# Patient Record
Sex: FEMALE | Race: BLACK OR AFRICAN AMERICAN | NOT HISPANIC OR LATINO | Employment: UNEMPLOYED | URBAN - METROPOLITAN AREA
[De-identification: names, ages, dates, MRNs, and addresses within clinical notes are randomized per-mention and may not be internally consistent; named-entity substitution may affect disease eponyms.]

---

## 2022-12-21 ENCOUNTER — PROCEDURE VISIT (OUTPATIENT)
Dept: MATERNAL FETAL MEDICINE | Facility: CLINIC | Age: 22
End: 2022-12-21
Payer: MEDICAID

## 2022-12-21 ENCOUNTER — OFFICE VISIT (OUTPATIENT)
Dept: OBSTETRICS AND GYNECOLOGY | Facility: CLINIC | Age: 22
End: 2022-12-21
Payer: MEDICAID

## 2022-12-21 VITALS
SYSTOLIC BLOOD PRESSURE: 114 MMHG | HEIGHT: 64 IN | DIASTOLIC BLOOD PRESSURE: 70 MMHG | BODY MASS INDEX: 24.59 KG/M2 | WEIGHT: 144 LBS | HEART RATE: 70 BPM

## 2022-12-21 DIAGNOSIS — Z32.01 POSITIVE URINE PREGNANCY TEST: ICD-10-CM

## 2022-12-21 DIAGNOSIS — N92.6 MISSED MENSES: Primary | ICD-10-CM

## 2022-12-21 PROCEDURE — 3074F PR MOST RECENT SYSTOLIC BLOOD PRESSURE < 130 MM HG: ICD-10-PCS | Mod: CPTII,S$GLB,,

## 2022-12-21 PROCEDURE — 99203 PR OFFICE/OUTPT VISIT, NEW, LEVL III, 30-44 MIN: ICD-10-PCS | Mod: TH,S$GLB,,

## 2022-12-21 PROCEDURE — 1159F MED LIST DOCD IN RCRD: CPT | Mod: CPTII,S$GLB,,

## 2022-12-21 PROCEDURE — 99203 OFFICE O/P NEW LOW 30 MIN: CPT | Mod: TH,S$GLB,,

## 2022-12-21 PROCEDURE — 1160F PR REVIEW ALL MEDS BY PRESCRIBER/CLIN PHARMACIST DOCUMENTED: ICD-10-PCS | Mod: CPTII,S$GLB,,

## 2022-12-21 PROCEDURE — 76815 US OB/GYN PROCEDURE (VIEWPOINT): ICD-10-PCS | Mod: S$GLB,,, | Performed by: OBSTETRICS & GYNECOLOGY

## 2022-12-21 PROCEDURE — 3078F PR MOST RECENT DIASTOLIC BLOOD PRESSURE < 80 MM HG: ICD-10-PCS | Mod: CPTII,S$GLB,,

## 2022-12-21 PROCEDURE — 1160F RVW MEDS BY RX/DR IN RCRD: CPT | Mod: CPTII,S$GLB,,

## 2022-12-21 PROCEDURE — 3008F PR BODY MASS INDEX (BMI) DOCUMENTED: ICD-10-PCS | Mod: CPTII,S$GLB,,

## 2022-12-21 PROCEDURE — 1159F PR MEDICATION LIST DOCUMENTED IN MEDICAL RECORD: ICD-10-PCS | Mod: CPTII,S$GLB,,

## 2022-12-21 PROCEDURE — 3008F BODY MASS INDEX DOCD: CPT | Mod: CPTII,S$GLB,,

## 2022-12-21 PROCEDURE — 3078F DIAST BP <80 MM HG: CPT | Mod: CPTII,S$GLB,,

## 2022-12-21 PROCEDURE — 3074F SYST BP LT 130 MM HG: CPT | Mod: CPTII,S$GLB,,

## 2022-12-21 PROCEDURE — 76815 OB US LIMITED FETUS(S): CPT | Mod: S$GLB,,, | Performed by: OBSTETRICS & GYNECOLOGY

## 2022-12-21 NOTE — PROGRESS NOTES
SUBJECTIVE:       Chief Complaint: Confirmation (Patient is here for a pregnancy confirmation. )    History of Present Illness: Trevor Cortes is a 22 y.o. female,  who presents for a confirmation of pregnancy. Patient reports absent menses and a positive home pregnancy test. She states her menses were previously regular. She is not currently on any contraception. She has no other complaints, issues, or concerns at this time. Patient denies nausea, vomiting, fatigue, cramping, and vaginal bleeding. She denies alcohol use, cigarette smoking, vaping, marijuana use, and illegal substance use. She denies a history of genital herpes. Patient reports she is taking a prenatal vitamin.    OB History    Para Term  AB Living   1 0 0 0 0 0   SAB IAB Ectopic Multiple Live Births   0 0 0 0 0      # Outcome Date GA Lbr Adrian/2nd Weight Sex Delivery Anes PTL Lv   1 Current               Review of Systems   Constitutional:  Negative for activity change, appetite change, chills, fatigue, fever and unexpected weight change.   HENT:  Negative for nasal congestion, dental problem, ear pain, postnasal drip, rhinorrhea, sinus pressure/congestion, sneezing and sore throat.    Eyes:  Negative for discharge, visual disturbance and eye dryness.   Respiratory:  Negative for cough, chest tightness and shortness of breath.    Cardiovascular:  Negative for chest pain, palpitations and leg swelling.   Gastrointestinal:  Negative for abdominal distention, abdominal pain, change in bowel habit, constipation, diarrhea, nausea, vomiting, reflux and change in bowel habit.   Endocrine: Negative for cold intolerance, heat intolerance, polydipsia and polyuria.   Genitourinary:  Negative for difficulty urinating, dyspareunia, dysuria, frequency, genital sores, hot flashes, menstrual irregularity, menstrual problem, pelvic pain, urgency, vaginal bleeding, vaginal discharge, vaginal pain and vaginal dryness.   Musculoskeletal:  Negative  "for back pain, myalgias, neck pain and neck stiffness.   Integumentary:  Negative for rash, breast mass, breast discharge and breast tenderness.   Allergic/Immunologic: Negative for environmental allergies and immunocompromised state.   Neurological:  Negative for dizziness, syncope, weakness, light-headedness, numbness and headaches.   Hematological:  Negative for adenopathy. Does not bruise/bleed easily.   Psychiatric/Behavioral:  Negative for confusion, decreased concentration and sleep disturbance. The patient is not nervous/anxious.    Breast: Negative for mass and tenderness      OBJECTIVE:      /70   Pulse 70   Ht 5' 4" (1.626 m)   Wt 65.3 kg (144 lb)   LMP 09/02/2022   BMI 24.72 kg/m²     Physical Exam:   Constitutional: She is oriented to person, place, and time. Vital signs are normal. She appears well-developed and well-nourished. She is cooperative.    HENT:   Head: Normocephalic and atraumatic.      Cardiovascular:  Normal rate and regular rhythm.             Pulmonary/Chest: Effort normal.                  Musculoskeletal: Moves all extremeties.      Right lower leg: No edema.      Left lower leg: No edema.       Neurological: She is alert and oriented to person, place, and time. GCS eye subscore is 4. GCS verbal subscore is 5. GCS motor subscore is 6.    Skin: Skin is warm and dry. Capillary refill takes less than 2 seconds.    Psychiatric: She has a normal mood and affect. Her speech is normal and behavior is normal. Mood, affect and thought content normal.     FHT = 150 bpm      ASSESSMENT:       1. Missed menses    2. Positive urine pregnancy test      KRISTI by LMP: 6/9/2023  EGA: 15 weeks 5 days      PLAN:      Missed menses  -     POCT Urine Pregnancy    Positive urine pregnancy test  -     US OB/GYN Procedure ([facility])-Future; Future; Expected date: 12/22/2022    Prenatal Counseling:  Prenatal vitamins with folic acid/folate  Common complaints of pregnancy in the first trimester " (nausea, vomiting, fatigue)  Foods to avoid (sushi, fish that are high in mercury, deli meat, and unpasteurized cheeses)  Routine prenatal testing including optional  genetic screening  Risk factors identified by prenatal history  Oriented to practice - discussed anticipated course of prenatal care and indications for ultrasound  Childbirth classes  Hospital facilities for delivery and emergency care  Nutrition and proper weight gain based on the [place] [employer]'s recommendations based on her pre-pregnancy weight  Toxoplasmosis precautions (cats)  Sexual activity and exercise  Environmental/work hazards  Travel  Tobacco (Ask, Advise, Assess, Assist, and Arrange), as well as alcohol and drug use  Use of any medications (including supplements, vitamins, herbs, or OTC Drugs)  Domestic violence  Seat belt use    Patient does desire genetic testing. She does want to know the sex of the baby. Ultrasound to confirm viability and KRISTI to be scheduled before next visit.    Follow up in about 1 week (around 2022) for initial OB appointment.

## 2022-12-23 DIAGNOSIS — Z36.89 ENCOUNTER FOR FETAL ANATOMIC SURVEY: Primary | ICD-10-CM

## 2022-12-29 ENCOUNTER — ROUTINE PRENATAL (OUTPATIENT)
Dept: OBSTETRICS AND GYNECOLOGY | Facility: CLINIC | Age: 22
End: 2022-12-29
Payer: MEDICAID

## 2022-12-29 ENCOUNTER — LAB VISIT (OUTPATIENT)
Dept: LAB | Facility: CLINIC | Age: 22
End: 2022-12-29
Payer: MEDICAID

## 2022-12-29 VITALS
BODY MASS INDEX: 24.63 KG/M2 | WEIGHT: 147.81 LBS | DIASTOLIC BLOOD PRESSURE: 54 MMHG | HEART RATE: 74 BPM | HEIGHT: 65 IN | SYSTOLIC BLOOD PRESSURE: 94 MMHG

## 2022-12-29 DIAGNOSIS — O09.32 LATE PRENATAL CARE AFFECTING PREGNANCY IN SECOND TRIMESTER: ICD-10-CM

## 2022-12-29 DIAGNOSIS — Z34.02 NORMAL FIRST PREGNANCY CONFIRMED, CURRENTLY IN SECOND TRIMESTER: ICD-10-CM

## 2022-12-29 DIAGNOSIS — Z3A.17 17 WEEKS GESTATION OF PREGNANCY: Primary | ICD-10-CM

## 2022-12-29 LAB
ABO + RH BLD: NORMAL
ALBUMIN SERPL BCP-MCNC: 3.4 G/DL (ref 3.5–5.2)
ALP SERPL-CCNC: 52 U/L (ref 55–135)
ALT SERPL W/O P-5'-P-CCNC: 19 U/L (ref 10–44)
AMPHET+METHAMPHET UR QL: NEGATIVE
ANION GAP SERPL CALC-SCNC: 8 MMOL/L (ref 8–16)
AST SERPL-CCNC: 17 U/L (ref 10–40)
BARBITURATES UR QL SCN>200 NG/ML: NEGATIVE
BASOPHILS # BLD AUTO: 0.02 K/UL (ref 0–0.2)
BASOPHILS NFR BLD: 0.3 % (ref 0–1.9)
BENZODIAZ UR QL SCN>200 NG/ML: NEGATIVE
BILIRUB SERPL-MCNC: 0.3 MG/DL (ref 0.1–1)
BLD GP AB SCN CELLS X3 SERPL QL: NORMAL
BUN SERPL-MCNC: 6 MG/DL (ref 6–20)
BZE UR QL SCN: NEGATIVE
CALCIUM SERPL-MCNC: 9.6 MG/DL (ref 8.7–10.5)
CANNABINOIDS UR QL SCN: NEGATIVE
CHLORIDE SERPL-SCNC: 108 MMOL/L (ref 95–110)
CO2 SERPL-SCNC: 19 MMOL/L (ref 23–29)
CREAT SERPL-MCNC: 0.6 MG/DL (ref 0.5–1.4)
CREAT UR-MCNC: 119 MG/DL (ref 15–325)
DIFFERENTIAL METHOD: ABNORMAL
EOSINOPHIL # BLD AUTO: 0.1 K/UL (ref 0–0.5)
EOSINOPHIL NFR BLD: 1.6 % (ref 0–8)
ERYTHROCYTE [DISTWIDTH] IN BLOOD BY AUTOMATED COUNT: 13.2 % (ref 11.5–14.5)
EST. GFR  (NO RACE VARIABLE): >60 ML/MIN/1.73 M^2
ETHANOL UR-MCNC: <10 MG/DL
GLUCOSE SERPL-MCNC: 78 MG/DL (ref 70–110)
HCT VFR BLD AUTO: 29.3 % (ref 37–48.5)
HGB BLD-MCNC: 10.2 G/DL (ref 12–16)
IMM GRANULOCYTES # BLD AUTO: 0.06 K/UL (ref 0–0.04)
IMM GRANULOCYTES NFR BLD AUTO: 0.9 % (ref 0–0.5)
LYMPHOCYTES # BLD AUTO: 1.7 K/UL (ref 1–4.8)
LYMPHOCYTES NFR BLD: 26.2 % (ref 18–48)
MCH RBC QN AUTO: 30.3 PG (ref 27–31)
MCHC RBC AUTO-ENTMCNC: 34.8 G/DL (ref 32–36)
MCV RBC AUTO: 87 FL (ref 82–98)
METHADONE UR QL SCN>300 NG/ML: NEGATIVE
MONOCYTES # BLD AUTO: 0.4 K/UL (ref 0.3–1)
MONOCYTES NFR BLD: 6.6 % (ref 4–15)
NEUTROPHILS # BLD AUTO: 4.1 K/UL (ref 1.8–7.7)
NEUTROPHILS NFR BLD: 64.4 % (ref 38–73)
NRBC BLD-RTO: 0 /100 WBC
OPIATES UR QL SCN: NEGATIVE
PCP UR QL SCN>25 NG/ML: NEGATIVE
PLATELET # BLD AUTO: 233 K/UL (ref 150–450)
PMV BLD AUTO: 11.2 FL (ref 9.2–12.9)
POTASSIUM SERPL-SCNC: 3.3 MMOL/L (ref 3.5–5.1)
PROT SERPL-MCNC: 6.9 G/DL (ref 6–8.4)
RBC # BLD AUTO: 3.37 M/UL (ref 4–5.4)
SODIUM SERPL-SCNC: 135 MMOL/L (ref 136–145)
TOXICOLOGY INFORMATION: NORMAL
WBC # BLD AUTO: 6.41 K/UL (ref 3.9–12.7)

## 2022-12-29 PROCEDURE — 86762 RUBELLA ANTIBODY: CPT

## 2022-12-29 PROCEDURE — 86900 BLOOD TYPING SEROLOGIC ABO: CPT

## 2022-12-29 PROCEDURE — 36415 COLL VENOUS BLD VENIPUNCTURE: CPT | Mod: ,,, | Performed by: STUDENT IN AN ORGANIZED HEALTH CARE EDUCATION/TRAINING PROGRAM

## 2022-12-29 PROCEDURE — 99213 OFFICE O/P EST LOW 20 MIN: CPT | Mod: TH,S$GLB,,

## 2022-12-29 PROCEDURE — 87086 URINE CULTURE/COLONY COUNT: CPT

## 2022-12-29 PROCEDURE — 86592 SYPHILIS TEST NON-TREP QUAL: CPT

## 2022-12-29 PROCEDURE — 87389 HIV-1 AG W/HIV-1&-2 AB AG IA: CPT

## 2022-12-29 PROCEDURE — 82105 ALPHA-FETOPROTEIN SERUM: CPT

## 2022-12-29 PROCEDURE — 87591 N.GONORRHOEAE DNA AMP PROB: CPT

## 2022-12-29 PROCEDURE — 99213 PR OFFICE/OUTPT VISIT, EST, LEVL III, 20-29 MIN: ICD-10-PCS | Mod: TH,S$GLB,,

## 2022-12-29 PROCEDURE — 80307 DRUG TEST PRSMV CHEM ANLYZR: CPT

## 2022-12-29 PROCEDURE — 36415 PR COLLECTION VENOUS BLOOD,VENIPUNCTURE: ICD-10-PCS | Mod: ,,, | Performed by: STUDENT IN AN ORGANIZED HEALTH CARE EDUCATION/TRAINING PROGRAM

## 2022-12-29 PROCEDURE — 80053 COMPREHEN METABOLIC PANEL: CPT

## 2022-12-29 PROCEDURE — 80074 ACUTE HEPATITIS PANEL: CPT

## 2022-12-29 PROCEDURE — 85025 COMPLETE CBC W/AUTO DIFF WBC: CPT

## 2022-12-29 PROCEDURE — 87491 CHLMYD TRACH DNA AMP PROBE: CPT

## 2022-12-29 NOTE — PROGRESS NOTES
"SUBJECTIVE:      Trevor Cortes is a 22 y.o. female  at 17w2d presenting for a routine pregnancy visit.  Patient reports no fetal movements. She denies cramping, contractions, vaginal bleeding and leaking.  She is taking prenatal vitamins.    Estimated Date of Delivery: 2023, by Ultrasound, dating reviewed.  Prenatal labs reviewed.    Review of Systems   Constitutional: Negative for activity change, appetite change, chills, fatigue, fever and unexpected weight change.   HENT: Negative for nasal congestion, dental problem, ear pain, postnasal drip, rhinorrhea, sinus pressure/congestion, sneezing and sore throat.    Eyes: Negative for discharge, visual disturbance and eye dryness.   Respiratory: Negative for cough, chest tightness and shortness of breath.    Cardiovascular: Negative for chest pain, palpitations and leg swelling.   Gastrointestinal: Negative for abdominal distention, abdominal pain, change in bowel habit, constipation, diarrhea, nausea, vomiting, reflux and change in bowel habit.  Genitourinary: Negative for difficulty urinating, dyspareunia, dysuria, flank pain, frequency, urgency, cramps, contractions, vaginal bleeding, vaginal discharge and vaginal pain.  Musculoskeletal: Negative for back pain and myalgias.   Integumentary:  Negative for rash, breast mass, and breast discharge.  Neurological: Negative for dizziness, syncope, weakness, light-headedness, numbness, headaches, disturbances or difficulties in coordination.  Hematological: Negative for unexplained bruising or bleeding.  Psychiatric/Behavioral: Negative sleep disturbance. The patient is not nervous/anxious.    OBJECTIVE:      BP (!) 94/54   Pulse 74   Ht 5' 5" (1.651 m)   Wt 67 kg (147 lb 12.8 oz)   LMP 2022   BMI 24.60 kg/m²     Physical Exam  Constitutional: She is oriented to person, place, and time. She appears well-developed and well-nourished.    Head: Normocephalic and atraumatic.      Cardiovascular: Normal " rate and regular rhythm.  Pulmonary/Chest: Effort normal.      Abdominal: Soft. Gravid. There is no abdominal tenderness.    Genitourinary: Deferred  Musculoskeletal: Moves all extremeties.       Neurological: She is alert and oriented to person, place, and time. GCS eye subscore is 4. GCS verbal subscore is 5. GCS motor subscore is 6.    Skin: Skin is warm and dry. Capillary refill takes less than 2 seconds.    Psychiatric: Her speech is normal and behavior is normal. Mood, affect and thought content normal.    ASSESSMENT:       1. 17 weeks gestation of pregnancy    2. Normal first pregnancy confirmed, currently in second trimester    3. Late prenatal care affecting pregnancy in second trimester      PLAN:   17 weeks gestation of pregnancy    Normal first pregnancy confirmed, currently in second trimester  -     CBC Auto Differential; Future; Expected date: 12/29/2022  -     Comprehensive Metabolic Panel; Future; Expected date: 12/29/2022  -     HIV 1/2 Ag/Ab (4th Gen); Future; Expected date: 12/29/2022  -     RPR; Future; Expected date: 12/29/2022  -     Hepatitis Panel, Acute; Future; Expected date: 12/29/2022  -     C. trachomatis/N. gonorrhoeae by AMP DNA [facility]; Urine  -     Toxicology Screen, Urine  -     Urine culture  -     Rubella Antibody, IgG; Future; Expected date: 12/29/2022  -     Type & Screen - Ob Profile; Future; Expected date: 12/29/2022  -     Genetic Misc Sendout Test, Blood; Future; Expected date: 12/29/2022  -     Maternal Screen AFP (Single Marker); Future; Expected date: 12/29/2022    Late prenatal care affecting pregnancy in second trimester    Initial OB labs and genetic screening today. Anatomy scan in 3 weeks. Continue prenatal vitamins and other medications as prescribed. Fetal movement counts, bleeding, pain, and labor precautions reviewed.    Follow-up in 3 week(s) for routine OB appointment.

## 2022-12-30 DIAGNOSIS — O99.012 ANEMIA DURING PREGNANCY IN SECOND TRIMESTER: Primary | ICD-10-CM

## 2022-12-30 LAB
C TRACH DNA SPEC QL NAA+PROBE: DETECTED
HAV IGM SERPL QL IA: NORMAL
HBV CORE IGM SERPL QL IA: NORMAL
HBV SURFACE AG SERPL QL IA: NORMAL
HCV AB SERPL QL IA: NORMAL
HIV 1+2 AB+HIV1 P24 AG SERPL QL IA: NORMAL
N GONORRHOEA DNA SPEC QL NAA+PROBE: NOT DETECTED
RPR SER QL: NORMAL
RUBV IGG SER-ACNC: 33.5 IU/ML
RUBV IGG SER-IMP: REACTIVE

## 2022-12-30 RX ORDER — FERROUS SULFATE 325(65) MG
325 TABLET, DELAYED RELEASE (ENTERIC COATED) ORAL DAILY
Qty: 90 TABLET | Refills: 1 | Status: SHIPPED | OUTPATIENT
Start: 2022-12-30 | End: 2023-06-28

## 2022-12-31 ENCOUNTER — PATIENT MESSAGE (OUTPATIENT)
Dept: OBSTETRICS AND GYNECOLOGY | Facility: CLINIC | Age: 22
End: 2022-12-31
Payer: MEDICAID

## 2022-12-31 LAB — BACTERIA UR CULT: NO GROWTH

## 2023-01-03 DIAGNOSIS — O98.312 CHLAMYDIA TRACHOMATIS INFECTION IN MOTHER DURING SECOND TRIMESTER OF PREGNANCY: Primary | ICD-10-CM

## 2023-01-03 DIAGNOSIS — A56.8 CHLAMYDIA TRACHOMATIS INFECTION IN MOTHER DURING SECOND TRIMESTER OF PREGNANCY: Primary | ICD-10-CM

## 2023-01-03 LAB
# FETUSES US: NORMAL
AFP INTERPRETATION: NORMAL
AFP MOM SERPL: 0.76
AFP SERPL-MCNC: 36 NG/ML
AFP SERPL-MCNC: NEGATIVE NG/ML
AGE AT DELIVERY: 20
GA (DAYS): 2 D
GA (WEEKS): 17 WK
GESTATIONAL AGE METHOD: NORMAL
IDDM PATIENT QL: NORMAL
SMOKING STATUS FTND: NO

## 2023-01-03 RX ORDER — AZITHROMYCIN 500 MG/1
1000 TABLET, FILM COATED ORAL ONCE
Qty: 2 TABLET | Refills: 0 | Status: SHIPPED | OUTPATIENT
Start: 2023-01-03 | End: 2023-01-03

## 2023-01-10 ENCOUNTER — PROCEDURE VISIT (OUTPATIENT)
Dept: MATERNAL FETAL MEDICINE | Facility: CLINIC | Age: 23
End: 2023-01-10
Payer: MEDICAID

## 2023-01-10 DIAGNOSIS — Z36.89 ENCOUNTER FOR FETAL ANATOMIC SURVEY: ICD-10-CM

## 2023-01-10 PROCEDURE — 76805 OB US >/= 14 WKS SNGL FETUS: CPT | Mod: S$GLB,,, | Performed by: OBSTETRICS & GYNECOLOGY

## 2023-01-10 PROCEDURE — 76805 US OB/GYN PROCEDURE (VIEWPOINT): ICD-10-PCS | Mod: S$GLB,,, | Performed by: OBSTETRICS & GYNECOLOGY

## 2023-01-17 ENCOUNTER — PATIENT MESSAGE (OUTPATIENT)
Dept: OTHER | Facility: OTHER | Age: 23
End: 2023-01-17
Payer: MEDICAID

## 2023-02-06 ENCOUNTER — ROUTINE PRENATAL (OUTPATIENT)
Dept: OBSTETRICS AND GYNECOLOGY | Facility: CLINIC | Age: 23
End: 2023-02-06
Payer: MEDICAID

## 2023-02-06 VITALS — WEIGHT: 157 LBS | SYSTOLIC BLOOD PRESSURE: 110 MMHG | DIASTOLIC BLOOD PRESSURE: 64 MMHG | BODY MASS INDEX: 26.13 KG/M2

## 2023-02-06 DIAGNOSIS — Z3A.22 22 WEEKS GESTATION OF PREGNANCY: Primary | ICD-10-CM

## 2023-02-06 DIAGNOSIS — O98.312 CHLAMYDIA TRACHOMATIS INFECTION IN MOTHER DURING SECOND TRIMESTER OF PREGNANCY: ICD-10-CM

## 2023-02-06 DIAGNOSIS — A56.8 CHLAMYDIA TRACHOMATIS INFECTION IN MOTHER DURING SECOND TRIMESTER OF PREGNANCY: ICD-10-CM

## 2023-02-06 PROCEDURE — 99213 OFFICE O/P EST LOW 20 MIN: CPT | Mod: TH,S$GLB,,

## 2023-02-06 PROCEDURE — 99213 PR OFFICE/OUTPT VISIT, EST, LEVL III, 20-29 MIN: ICD-10-PCS | Mod: TH,S$GLB,,

## 2023-02-06 NOTE — PROGRESS NOTES
SUBJECTIVE:      Trevor Cortes is a 22 y.o. female  at 22w6d presenting for a routine pregnancy visit.  Patient reports good fetal movements. She denies cramping, contractions, vaginal bleeding and leaking.  She is taking prenatal vitamins.    Estimated Date of Delivery: 2023, by Ultrasound, dating reviewed.  Prenatal labs reviewed.    Review of Systems   Constitutional: Negative for activity change, appetite change, chills, fatigue, fever and unexpected weight change.   HENT: Negative for nasal congestion, dental problem, ear pain, postnasal drip, rhinorrhea, sinus pressure/congestion, sneezing and sore throat.    Eyes: Negative for discharge, visual disturbance and eye dryness.   Respiratory: Negative for cough, chest tightness and shortness of breath.    Cardiovascular: Negative for chest pain, palpitations and leg swelling.   Gastrointestinal: Negative for abdominal distention, abdominal pain, change in bowel habit, constipation, diarrhea, nausea, vomiting, reflux and change in bowel habit.  Genitourinary: Negative for difficulty urinating, dyspareunia, dysuria, flank pain, frequency, urgency, cramps, contractions, vaginal bleeding, vaginal discharge and vaginal pain.  Musculoskeletal: Negative for back pain and myalgias.   Integumentary:  Negative for rash, breast mass, and breast discharge.  Neurological: Negative for dizziness, syncope, weakness, light-headedness, numbness, headaches, disturbances or difficulties in coordination.  Hematological: Negative for unexplained bruising or bleeding.  Psychiatric/Behavioral: Negative sleep disturbance. The patient is not nervous/anxious.    OBJECTIVE:      /64   Wt 71.2 kg (157 lb)   LMP 2022   BMI 26.13 kg/m²     Physical Exam  Constitutional: She is oriented to person, place, and time. She appears well-developed and well-nourished.    Head: Normocephalic and atraumatic.      Cardiovascular: Normal rate and regular rhythm.  Pulmonary/Chest:  Effort normal.      Abdominal: Soft. Gravid. There is no abdominal tenderness.    Genitourinary: Deferred  Musculoskeletal: Moves all extremeties.       Neurological: She is alert and oriented to person, place, and time. GCS eye subscore is 4. GCS verbal subscore is 5. GCS motor subscore is 6.    Skin: Skin is warm and dry. Capillary refill takes less than 2 seconds.    Psychiatric: Her speech is normal and behavior is normal. Mood, affect and thought content normal.    ASSESSMENT:       1. 22 weeks gestation of pregnancy    2. Chlamydia trachomatis infection in mother during second trimester of pregnancy      PLAN:   22 weeks gestation of pregnancy    Chlamydia trachomatis infection in mother during second trimester of pregnancy  -     C. trachomatis/N. gonorrhoeae by AMP DNA [facility]; Urine    GARRY for CT today. Continue prenatal vitamins and other medications as prescribed. Fetal movement counts, bleeding, pain, and labor precautions reviewed.    Follow-up in 4 week(s) for routine OB appointment.

## 2023-02-14 ENCOUNTER — PATIENT MESSAGE (OUTPATIENT)
Dept: OTHER | Facility: OTHER | Age: 23
End: 2023-02-14
Payer: MEDICAID

## 2023-02-28 ENCOUNTER — PATIENT MESSAGE (OUTPATIENT)
Dept: OTHER | Facility: OTHER | Age: 23
End: 2023-02-28
Payer: MEDICAID

## 2023-03-14 ENCOUNTER — PATIENT MESSAGE (OUTPATIENT)
Dept: OBSTETRICS AND GYNECOLOGY | Facility: CLINIC | Age: 23
End: 2023-03-14
Payer: MEDICAID

## 2023-03-14 ENCOUNTER — PATIENT MESSAGE (OUTPATIENT)
Dept: OTHER | Facility: OTHER | Age: 23
End: 2023-03-14
Payer: MEDICAID

## 2023-03-15 ENCOUNTER — LAB VISIT (OUTPATIENT)
Dept: LAB | Facility: CLINIC | Age: 23
End: 2023-03-15
Payer: MEDICAID

## 2023-03-15 ENCOUNTER — ROUTINE PRENATAL (OUTPATIENT)
Dept: OBSTETRICS AND GYNECOLOGY | Facility: CLINIC | Age: 23
End: 2023-03-15
Payer: MEDICAID

## 2023-03-15 VITALS
WEIGHT: 168 LBS | DIASTOLIC BLOOD PRESSURE: 68 MMHG | SYSTOLIC BLOOD PRESSURE: 109 MMHG | BODY MASS INDEX: 27.96 KG/M2 | HEART RATE: 87 BPM

## 2023-03-15 DIAGNOSIS — Z86.19 HISTORY OF CHLAMYDIA: ICD-10-CM

## 2023-03-15 DIAGNOSIS — Z3A.29 29 WEEKS GESTATION OF PREGNANCY: Primary | ICD-10-CM

## 2023-03-15 DIAGNOSIS — Z3A.29 29 WEEKS GESTATION OF PREGNANCY: ICD-10-CM

## 2023-03-15 PROCEDURE — 99213 PR OFFICE/OUTPT VISIT, EST, LEVL III, 20-29 MIN: ICD-10-PCS | Mod: TH,S$GLB,,

## 2023-03-15 PROCEDURE — 82950 GLUCOSE TEST: CPT

## 2023-03-15 PROCEDURE — 87591 N.GONORRHOEAE DNA AMP PROB: CPT

## 2023-03-15 PROCEDURE — 99213 OFFICE O/P EST LOW 20 MIN: CPT | Mod: TH,S$GLB,,

## 2023-03-15 PROCEDURE — 85025 COMPLETE CBC W/AUTO DIFF WBC: CPT

## 2023-03-15 NOTE — PROGRESS NOTES
SUBJECTIVE:      Trevor Cortes is a 22 y.o. female  at 28w1d presenting for a routine pregnancy visit.  Patient reports good fetal movements. She denies cramping, contractions, vaginal bleeding and leaking.  She is taking prenatal vitamins.    Estimated Date of Delivery: 2023, by Ultrasound, dating reviewed.  Prenatal labs reviewed.    Review of Systems   Constitutional: Negative for activity change, appetite change, chills, fatigue, fever and unexpected weight change.   HENT: Negative for nasal congestion, dental problem, ear pain, postnasal drip, rhinorrhea, sinus pressure/congestion, sneezing and sore throat.    Eyes: Negative for discharge, visual disturbance and eye dryness.   Respiratory: Negative for cough, chest tightness and shortness of breath.    Cardiovascular: Negative for chest pain, palpitations and leg swelling.   Gastrointestinal: Negative for abdominal distention, abdominal pain, change in bowel habit, constipation, diarrhea, nausea, vomiting, reflux and change in bowel habit.  Genitourinary: Negative for difficulty urinating, dyspareunia, dysuria, flank pain, frequency, urgency, cramps, contractions, vaginal bleeding, vaginal discharge and vaginal pain.  Musculoskeletal: Negative for back pain and myalgias.   Integumentary:  Negative for rash, breast mass, and breast discharge.  Neurological: Negative for dizziness, syncope, weakness, light-headedness, numbness, headaches, disturbances or difficulties in coordination.  Hematological: Negative for unexplained bruising or bleeding.  Psychiatric/Behavioral: Negative sleep disturbance. The patient is not nervous/anxious.    OBJECTIVE:      /68   Pulse 87   Wt 76.2 kg (168 lb)   LMP 2022   BMI 27.96 kg/m²     Physical Exam  Constitutional: She is oriented to person, place, and time. She appears well-developed and well-nourished.    Head: Normocephalic and atraumatic.      Cardiovascular: Normal rate and regular  rhythm.  Pulmonary/Chest: Effort normal.      Abdominal: Soft. Gravid. There is no abdominal tenderness.    Genitourinary: Deferred  Musculoskeletal: Moves all extremeties.       Neurological: She is alert and oriented to person, place, and time. GCS eye subscore is 4. GCS verbal subscore is 5. GCS motor subscore is 6.    Skin: Skin is warm and dry. Capillary refill takes less than 2 seconds.    Psychiatric: Her speech is normal and behavior is normal. Mood, affect and thought content normal.    ASSESSMENT:       1. 29 weeks gestation of pregnancy    2. History of chlamydia      PLAN:   29 weeks gestation of pregnancy  -     OB Glucose Screen; Future; Expected date: 03/15/2023  -     CBC Auto Differential; Future; Expected date: 03/15/2023    History of chlamydia  -     C. trachomatis/N. gonorrhoeae by AMP DNA [facility]; Urine    CT GARRY today as well as OB glucose challenge. Compliance with prenatal care reinforced. Continue prenatal vitamins and other medications as prescribed. Fetal movement counts, bleeding, pain, and labor precautions reviewed.    Follow-up in 2 week(s) for routine OB appointment.

## 2023-03-16 LAB
BASOPHILS # BLD AUTO: 0.03 K/UL (ref 0–0.2)
BASOPHILS NFR BLD: 0.3 % (ref 0–1.9)
DIFFERENTIAL METHOD: ABNORMAL
EOSINOPHIL # BLD AUTO: 0.1 K/UL (ref 0–0.5)
EOSINOPHIL NFR BLD: 1.5 % (ref 0–8)
ERYTHROCYTE [DISTWIDTH] IN BLOOD BY AUTOMATED COUNT: 12.5 % (ref 11.5–14.5)
GLUCOSE SERPL-MCNC: 70 MG/DL (ref 70–140)
HCT VFR BLD AUTO: 27 % (ref 37–48.5)
HGB BLD-MCNC: 9.2 G/DL (ref 12–16)
IMM GRANULOCYTES # BLD AUTO: 0.09 K/UL (ref 0–0.04)
IMM GRANULOCYTES NFR BLD AUTO: 1 % (ref 0–0.5)
LYMPHOCYTES # BLD AUTO: 1.3 K/UL (ref 1–4.8)
LYMPHOCYTES NFR BLD: 14.2 % (ref 18–48)
MCH RBC QN AUTO: 30.5 PG (ref 27–31)
MCHC RBC AUTO-ENTMCNC: 34.1 G/DL (ref 32–36)
MCV RBC AUTO: 89 FL (ref 82–98)
MONOCYTES # BLD AUTO: 0.7 K/UL (ref 0.3–1)
MONOCYTES NFR BLD: 7.3 % (ref 4–15)
NEUTROPHILS # BLD AUTO: 7 K/UL (ref 1.8–7.7)
NEUTROPHILS NFR BLD: 75.7 % (ref 38–73)
NRBC BLD-RTO: 0 /100 WBC
PLATELET # BLD AUTO: 187 K/UL (ref 150–450)
PMV BLD AUTO: 12.2 FL (ref 9.2–12.9)
RBC # BLD AUTO: 3.02 M/UL (ref 4–5.4)
WBC # BLD AUTO: 9.29 K/UL (ref 3.9–12.7)

## 2023-03-16 PROCEDURE — 36415 COLL VENOUS BLD VENIPUNCTURE: CPT | Mod: ,,, | Performed by: STUDENT IN AN ORGANIZED HEALTH CARE EDUCATION/TRAINING PROGRAM

## 2023-03-16 PROCEDURE — 36415 PR COLLECTION VENOUS BLOOD,VENIPUNCTURE: ICD-10-PCS | Mod: ,,, | Performed by: STUDENT IN AN ORGANIZED HEALTH CARE EDUCATION/TRAINING PROGRAM

## 2023-03-17 LAB
C TRACH DNA SPEC QL NAA+PROBE: NOT DETECTED
N GONORRHOEA DNA SPEC QL NAA+PROBE: NOT DETECTED

## 2023-03-28 ENCOUNTER — PATIENT MESSAGE (OUTPATIENT)
Dept: OTHER | Facility: OTHER | Age: 23
End: 2023-03-28
Payer: MEDICAID

## 2023-03-31 ENCOUNTER — ROUTINE PRENATAL (OUTPATIENT)
Dept: OBSTETRICS AND GYNECOLOGY | Facility: CLINIC | Age: 23
End: 2023-03-31
Payer: MEDICAID

## 2023-03-31 VITALS
DIASTOLIC BLOOD PRESSURE: 63 MMHG | BODY MASS INDEX: 28.02 KG/M2 | WEIGHT: 168.38 LBS | HEART RATE: 86 BPM | SYSTOLIC BLOOD PRESSURE: 114 MMHG

## 2023-03-31 DIAGNOSIS — Z3A.30 30 WEEKS GESTATION OF PREGNANCY: Primary | ICD-10-CM

## 2023-03-31 DIAGNOSIS — Z23 NEED FOR TDAP VACCINATION: ICD-10-CM

## 2023-03-31 DIAGNOSIS — O99.013 ANEMIA DURING PREGNANCY IN THIRD TRIMESTER: ICD-10-CM

## 2023-03-31 PROCEDURE — 90715 PR TDAP VACCINE >7 YO, IM: ICD-10-PCS | Mod: S$GLB,,,

## 2023-03-31 PROCEDURE — 90715 TDAP VACCINE 7 YRS/> IM: CPT | Mod: S$GLB,,,

## 2023-03-31 PROCEDURE — 90471 IMMUNIZATION ADMIN: CPT | Mod: S$GLB,,,

## 2023-03-31 PROCEDURE — 59425 ANTEPARTUM CARE ONLY: CPT | Mod: TH,S$GLB,,

## 2023-03-31 PROCEDURE — 59425 PR ANTEPARTUM CARE ONLY, 4-6 VISITS: ICD-10-PCS | Mod: TH,S$GLB,,

## 2023-03-31 PROCEDURE — 90471 PR IMMUNIZ ADMIN,1 SINGLE/COMB VAC/TOXOID: ICD-10-PCS | Mod: S$GLB,,,

## 2023-03-31 NOTE — NURSING
.Patient presents for Tdap vaccination.  Tdap vaccine given IM per provider order.  See MAR for further details.  Patient tolerated well.  No signs or symptoms of adverse reaction after 15 minutes.

## 2023-03-31 NOTE — PROGRESS NOTES
SUBJECTIVE:      Trevor Cortes is a 22 y.o. female  at 30w3d presenting for a routine pregnancy visit.  Patient reports good fetal movements. She denies cramping, contractions, vaginal bleeding and leaking.  She is taking prenatal vitamins.    Estimated Date of Delivery: 2023, by Ultrasound, dating reviewed.  Prenatal labs reviewed.    Review of Systems   Constitutional: Negative for activity change, appetite change, chills, fatigue, fever and unexpected weight change.   HENT: Negative for nasal congestion, dental problem, ear pain, postnasal drip, rhinorrhea, sinus pressure/congestion, sneezing and sore throat.    Eyes: Negative for discharge, visual disturbance and eye dryness.   Respiratory: Negative for cough, chest tightness and shortness of breath.    Cardiovascular: Negative for chest pain, palpitations and leg swelling.   Gastrointestinal: Negative for abdominal distention, abdominal pain, change in bowel habit, constipation, diarrhea, nausea, vomiting, reflux and change in bowel habit.  Genitourinary: Negative for difficulty urinating, dyspareunia, dysuria, flank pain, frequency, urgency, cramps, contractions, vaginal bleeding, vaginal discharge and vaginal pain.  Musculoskeletal: Negative for back pain and myalgias.   Integumentary:  Negative for rash, breast mass, and breast discharge.  Neurological: Negative for dizziness, syncope, weakness, light-headedness, numbness, headaches, disturbances or difficulties in coordination.  Hematological: Negative for unexplained bruising or bleeding.  Psychiatric/Behavioral: Negative sleep disturbance. The patient is not nervous/anxious.    OBJECTIVE:      /63   Pulse 86   Wt 76.4 kg (168 lb 6.4 oz)   LMP 2022   BMI 28.02 kg/m²     Physical Exam  Constitutional: She is oriented to person, place, and time. She appears well-developed and well-nourished.    Head: Normocephalic and atraumatic.      Cardiovascular: Normal rate and regular  rhythm.  Pulmonary/Chest: Effort normal.      Abdominal: Soft. Gravid. There is no abdominal tenderness.    Genitourinary: Deferred  Musculoskeletal: Moves all extremeties.       Neurological: She is alert and oriented to person, place, and time. GCS eye subscore is 4. GCS verbal subscore is 5. GCS motor subscore is 6.    Skin: Skin is warm and dry. Capillary refill takes less than 2 seconds.    Psychiatric: Her speech is normal and behavior is normal. Mood, affect and thought content normal.    ASSESSMENT:       1. 30 weeks gestation of pregnancy    2. Anemia during pregnancy in third trimester    3. Need for Tdap vaccination      PLAN:   30 weeks gestation of pregnancy    Anemia during pregnancy in third trimester    Need for Tdap vaccination  -     Tdap (BOOSTRIX) vaccine injection 0.5 mL    Tdap today. Recheck CBC and RPR at 34-36 weeks. Continue prenatal vitamins, iron, and other medications as prescribed. Fetal movement counts, bleeding, pain, and labor precautions reviewed.    Follow-up in 2 week(s) for routine OB appointment.

## 2023-04-10 ENCOUNTER — PATIENT MESSAGE (OUTPATIENT)
Dept: OBSTETRICS AND GYNECOLOGY | Facility: CLINIC | Age: 23
End: 2023-04-10
Payer: MEDICAID

## 2023-04-11 ENCOUNTER — PATIENT MESSAGE (OUTPATIENT)
Dept: OTHER | Facility: OTHER | Age: 23
End: 2023-04-11
Payer: MEDICAID

## 2023-04-14 ENCOUNTER — ROUTINE PRENATAL (OUTPATIENT)
Dept: OBSTETRICS AND GYNECOLOGY | Facility: CLINIC | Age: 23
End: 2023-04-14
Payer: MEDICAID

## 2023-04-14 VITALS — WEIGHT: 174.88 LBS | SYSTOLIC BLOOD PRESSURE: 108 MMHG | BODY MASS INDEX: 29.1 KG/M2 | DIASTOLIC BLOOD PRESSURE: 63 MMHG

## 2023-04-14 DIAGNOSIS — O26.843 FUNDAL HEIGHT LOW FOR DATES IN THIRD TRIMESTER: ICD-10-CM

## 2023-04-14 DIAGNOSIS — O99.013 ANEMIA DURING PREGNANCY IN THIRD TRIMESTER: ICD-10-CM

## 2023-04-14 DIAGNOSIS — Z3A.32 32 WEEKS GESTATION OF PREGNANCY: Primary | ICD-10-CM

## 2023-04-14 PROCEDURE — 59425 ANTEPARTUM CARE ONLY: CPT | Mod: TH,S$GLB,,

## 2023-04-14 PROCEDURE — 59425 PR ANTEPARTUM CARE ONLY, 4-6 VISITS: ICD-10-PCS | Mod: TH,S$GLB,,

## 2023-04-14 NOTE — PROGRESS NOTES
SUBJECTIVE:      Trevor Cortes is a 22 y.o. female  at 32w3d presenting for a routine pregnancy visit.  Patient reports good fetal movements. She denies cramping, contractions, vaginal bleeding and leaking.  She is taking prenatal vitamins.    Estimated Date of Delivery: 2023, by Ultrasound, dating reviewed.  Prenatal labs reviewed.    Review of Systems   Constitutional: Negative for activity change, appetite change, chills, fatigue, fever and unexpected weight change.   HENT: Negative for nasal congestion, dental problem, ear pain, postnasal drip, rhinorrhea, sinus pressure/congestion, sneezing and sore throat.    Eyes: Negative for discharge, visual disturbance and eye dryness.   Respiratory: Negative for cough, chest tightness and shortness of breath.    Cardiovascular: Negative for chest pain, palpitations and leg swelling.   Gastrointestinal: Negative for abdominal distention, abdominal pain, change in bowel habit, constipation, diarrhea, nausea, vomiting, reflux and change in bowel habit.  Genitourinary: Negative for difficulty urinating, dyspareunia, dysuria, flank pain, frequency, urgency, cramps, contractions, vaginal bleeding, vaginal discharge and vaginal pain.  Musculoskeletal: Negative for back pain and myalgias.   Integumentary:  Negative for rash, breast mass, and breast discharge.  Neurological: Negative for dizziness, syncope, weakness, light-headedness, numbness, headaches, disturbances or difficulties in coordination.  Hematological: Negative for unexplained bruising or bleeding.  Psychiatric/Behavioral: Negative sleep disturbance. The patient is not nervous/anxious.    OBJECTIVE:      /63   Wt 79.3 kg (174 lb 14.4 oz)   LMP 2022   BMI 29.10 kg/m²     Physical Exam  Constitutional: She is oriented to person, place, and time. She appears well-developed and well-nourished.    Head: Normocephalic and atraumatic.      Cardiovascular: Normal rate and regular  rhythm.  Pulmonary/Chest: Effort normal.      Abdominal: Soft. Gravid. There is no abdominal tenderness.    Genitourinary: Deferred  Musculoskeletal: Moves all extremeties.       Neurological: She is alert and oriented to person, place, and time. GCS eye subscore is 4. GCS verbal subscore is 5. GCS motor subscore is 6.    Skin: Skin is warm and dry. Capillary refill takes less than 2 seconds.    Psychiatric: Her speech is normal and behavior is normal. Mood, affect and thought content normal.    ASSESSMENT:       1. 32 weeks gestation of pregnancy    2. Anemia during pregnancy in third trimester      PLAN:   32 weeks gestation of pregnancy    Anemia during pregnancy in third trimester    Fundal height lagging; no growth since last visit. Growth U/S ordered. Repeat STD/STI screen at next visit with CBC.    Continue prenatal vitamins and other medications as prescribed. Fetal movement counts, bleeding, pain, and labor precautions reviewed.    Follow-up in 2 week(s) for routine OB appointment.

## 2023-04-21 ENCOUNTER — ROUTINE PRENATAL (OUTPATIENT)
Dept: OBSTETRICS AND GYNECOLOGY | Facility: CLINIC | Age: 23
End: 2023-04-21
Payer: MEDICAID

## 2023-04-21 VITALS — BODY MASS INDEX: 29.12 KG/M2 | WEIGHT: 175 LBS | DIASTOLIC BLOOD PRESSURE: 63 MMHG | SYSTOLIC BLOOD PRESSURE: 107 MMHG

## 2023-04-21 DIAGNOSIS — Z86.19 HISTORY OF CHLAMYDIA: ICD-10-CM

## 2023-04-21 DIAGNOSIS — O99.013 ANEMIA DURING PREGNANCY IN THIRD TRIMESTER: ICD-10-CM

## 2023-04-21 DIAGNOSIS — O26.841 UTERINE SIZE-DATE DISCREPANCY IN FIRST TRIMESTER: ICD-10-CM

## 2023-04-21 DIAGNOSIS — Z3A.33 33 WEEKS GESTATION OF PREGNANCY: Primary | ICD-10-CM

## 2023-04-21 PROCEDURE — 59425 ANTEPARTUM CARE ONLY: CPT | Mod: TH,S$GLB,, | Performed by: ADVANCED PRACTICE MIDWIFE

## 2023-04-21 PROCEDURE — 59425 PR ANTEPARTUM CARE ONLY, 4-6 VISITS: ICD-10-PCS | Mod: TH,S$GLB,, | Performed by: ADVANCED PRACTICE MIDWIFE

## 2023-04-21 NOTE — PROGRESS NOTES
SUBJECTIVE:      Trevor Cortes is a 22 y.o. female  at 33 +3/7 weeks. presenting for a routine pregnancy visit.  Patient reports good fetal movements. She denies cramping, contractions, vaginal bleeding and leaking.  NO S&S of pre eclampsia.     Estimated Date of Delivery: 2023, by Ultrasound.  Review of Systems   Constitutional: Negative for activity change, appetite change, chills, fatigue, fever and unexpected weight change.   HENT: Negative for nasal congestion, dental problem, ear pain, postnasal drip, rhinorrhea, sinus pressure/congestion, sneezing and sore throat.    Eyes: Negative for discharge, visual disturbance and eye dryness.   Respiratory: Negative for cough, chest tightness and shortness of breath.    Cardiovascular: Negative for chest pain, palpitations and leg swelling.   Gastrointestinal: Negative for abdominal distention, abdominal pain, change in bowel habit, constipation, diarrhea, nausea, vomiting, reflux and change in bowel habit.  Genitourinary: Negative for difficulty urinating, dyspareunia, dysuria, flank pain, frequency, urgency, cramps, contractions, vaginal bleeding, vaginal discharge and vaginal pain.  Musculoskeletal: Negative for back pain and myalgias.   Integumentary:  Negative for rash, breast mass, and breast discharge.  Neurological: Negative for dizziness, syncope, weakness, light-headedness, numbness, headaches, disturbances or difficulties in coordination.  Hematological: Negative for unexplained bruising or bleeding.  Psychiatric/Behavioral: Negative sleep disturbance. The patient is not nervous/anxious.    OBJECTIVE:      /63   Wt 79.4 kg (175 lb)   LMP 2022   BMI 29.12 kg/m²   Fhts 130s-140s  Physical Exam  Constitutional: She is oriented to person, place, and time. She appears well-developed and well-nourished.    Abdominal: Soft. Gravid. There is no abdominal tenderness.  FH 30 cm, size less dates.  Genitourinary: Deferred  Neurological: She  is alert and oriented to person, place, and time. Her speech is normal and behavior is normal. Mood, affect and thought content normal.    ASSESSMENT:       1. 33 weeks gestation of pregnancy    2. Anemia during pregnancy in third trimester    3. History of chlamydia    4. Uterine size-date discrepancy in first trimester      PLAN:   33 weeks gestation of pregnancy    Anemia during pregnancy in third trimester    History of chlamydia    Uterine size-date discrepancy in first trimester    1. Continue PNV and Ferrous Sulfate.  2. S&S of PTL/PPROM Reinforced. Strict FMC discussed.  3. Keep scheduled growth US for next week.  4. Jayjay in 2 weeks then weekly until delivery.  Delivery planned at . IV analgesia/epidural prn. Plans to breastfeed. Unsure of planned contraceptive method.

## 2023-04-25 ENCOUNTER — PROCEDURE VISIT (OUTPATIENT)
Dept: MATERNAL FETAL MEDICINE | Facility: CLINIC | Age: 23
End: 2023-04-25
Payer: MEDICAID

## 2023-04-25 DIAGNOSIS — O26.843 FUNDAL HEIGHT LOW FOR DATES IN THIRD TRIMESTER: ICD-10-CM

## 2023-04-25 PROCEDURE — 76816 OB US FOLLOW-UP PER FETUS: CPT | Mod: S$GLB,,, | Performed by: OBSTETRICS & GYNECOLOGY

## 2023-04-25 PROCEDURE — 76816 US OB/GYN PROCEDURE (VIEWPOINT): ICD-10-PCS | Mod: S$GLB,,, | Performed by: OBSTETRICS & GYNECOLOGY

## 2023-05-02 ENCOUNTER — PATIENT MESSAGE (OUTPATIENT)
Dept: OTHER | Facility: OTHER | Age: 23
End: 2023-05-02
Payer: MEDICAID

## 2023-05-23 ENCOUNTER — ROUTINE PRENATAL (OUTPATIENT)
Dept: OBSTETRICS AND GYNECOLOGY | Facility: CLINIC | Age: 23
End: 2023-05-23
Payer: MEDICAID

## 2023-05-23 VITALS
BODY MASS INDEX: 29.12 KG/M2 | WEIGHT: 175 LBS | HEART RATE: 75 BPM | DIASTOLIC BLOOD PRESSURE: 64 MMHG | SYSTOLIC BLOOD PRESSURE: 102 MMHG

## 2023-05-23 DIAGNOSIS — O09.893 NON-COMPLIANT PREGNANT PATIENT, THIRD TRIMESTER: ICD-10-CM

## 2023-05-23 DIAGNOSIS — Z3A.38 38 WEEKS GESTATION OF PREGNANCY: Primary | ICD-10-CM

## 2023-05-23 DIAGNOSIS — O99.013 ANEMIA DURING PREGNANCY IN THIRD TRIMESTER: ICD-10-CM

## 2023-05-23 DIAGNOSIS — O09.33 LIMITED PRENATAL CARE IN THIRD TRIMESTER: ICD-10-CM

## 2023-05-23 DIAGNOSIS — Z91.199 NON-COMPLIANT PREGNANT PATIENT, THIRD TRIMESTER: ICD-10-CM

## 2023-05-23 PROCEDURE — 81514 NFCT DS BV&VAGINITIS DNA ALG: CPT

## 2023-05-23 PROCEDURE — 59426 ANTEPARTUM CARE ONLY: CPT | Mod: TH,S$GLB,,

## 2023-05-23 PROCEDURE — 87081 CULTURE SCREEN ONLY: CPT

## 2023-05-23 PROCEDURE — 59426 PR ANTEPARTUM CARE ONLY, >7 VISITS: ICD-10-PCS | Mod: TH,S$GLB,,

## 2023-05-23 PROCEDURE — 87591 N.GONORRHOEAE DNA AMP PROB: CPT

## 2023-05-23 NOTE — PROGRESS NOTES
SUBJECTIVE:      Trevor Cortes is a 22 y.o. female  at 38w0d presenting for a routine pregnancy visit.  Patient reports good fetal movements. She denies cramping, contractions, vaginal bleeding and leaking.  She is taking prenatal vitamins. Patient has not been seen for routine prenatal care in 5 weeks.     Estimated Date of Delivery: 2023, by Ultrasound, dating reviewed.  Prenatal labs reviewed.    Review of Systems   Constitutional: Negative for activity change, appetite change, chills, fatigue, fever and unexpected weight change.   HENT: Negative for nasal congestion, dental problem, ear pain, postnasal drip, rhinorrhea, sinus pressure/congestion, sneezing and sore throat.    Eyes: Negative for discharge, visual disturbance and eye dryness.   Respiratory: Negative for cough, chest tightness and shortness of breath.    Cardiovascular: Negative for chest pain, palpitations and leg swelling.   Gastrointestinal: Negative for abdominal distention, abdominal pain, change in bowel habit, constipation, diarrhea, nausea, vomiting, reflux and change in bowel habit.  Genitourinary: Negative for difficulty urinating, dyspareunia, dysuria, flank pain, frequency, urgency, cramps, contractions, vaginal bleeding, vaginal discharge and vaginal pain.  Musculoskeletal: Negative for back pain and myalgias.   Integumentary:  Negative for rash, breast mass, and breast discharge.  Neurological: Negative for dizziness, syncope, weakness, light-headedness, numbness, headaches, disturbances or difficulties in coordination.  Hematological: Negative for unexplained bruising or bleeding.  Psychiatric/Behavioral: Negative sleep disturbance. The patient is not nervous/anxious.    OBJECTIVE:      /64   Pulse 75   Wt 79.4 kg (175 lb)   LMP 2022   BMI 29.12 kg/m²     Physical Exam  Constitutional: She is oriented to person, place, and time. She appears well-developed and well-nourished.    Head: Normocephalic and  atraumatic.      Cardiovascular: Normal rate and regular rhythm.  Pulmonary/Chest: Effort normal.      Abdominal: Soft. Gravid. There is no abdominal tenderness.    Genitourinary: Deferred  Musculoskeletal: Moves all extremeties.       Neurological: She is alert and oriented to person, place, and time. GCS eye subscore is 4. GCS verbal subscore is 5. GCS motor subscore is 6.    Skin: Skin is warm and dry. Capillary refill takes less than 2 seconds.    Psychiatric: Her speech is normal and behavior is normal. Mood, affect and thought content normal.    ASSESSMENT:       1. 38 weeks gestation of pregnancy    2. Limited prenatal care in third trimester    3. Non-compliant pregnant patient, third trimester    4. Anemia during pregnancy in third trimester      PLAN:   38 weeks gestation of pregnancy  -     Strep B Screen, Vaginal / Rectal; Future  -     RPR; Future; Expected date: 05/23/2023  -     C. trachomatis/N. gonorrhoeae by AMP DNA [place]; Cervicovaginal  -     Vaginosis Screen by DNA Probe    Limited prenatal care in third trimester    Non-compliant pregnant patient, third trimester    Anemia during pregnancy in third trimester  -     CBC Auto Differential; Future; Expected date: 05/23/2023      Compliance with routine prenatal care reinforced. Continue prenatal vitamins and other medications as prescribed. Fetal movement counts, bleeding, pain, and labor precautions reviewed. Reassurance provided. All questions answered. Patient verbalized understanding.    Follow-up in 1 week(s) for routine OB appointment with Dr. Summers.

## 2023-05-24 LAB
BACTERIAL VAGINOSIS DNA: NEGATIVE
C TRACH DNA SPEC QL NAA+PROBE: NOT DETECTED
CANDIDA GLABRATA DNA: NEGATIVE
CANDIDA KRUSEI DNA: NEGATIVE
CANDIDA RRNA VAG QL PROBE: NEGATIVE
N GONORRHOEA DNA SPEC QL NAA+PROBE: NOT DETECTED
T VAGINALIS RRNA GENITAL QL PROBE: NEGATIVE

## 2023-05-26 LAB — BACTERIA SPEC AEROBE CULT: NORMAL

## 2023-06-02 ENCOUNTER — ROUTINE PRENATAL (OUTPATIENT)
Dept: OBSTETRICS AND GYNECOLOGY | Facility: CLINIC | Age: 23
End: 2023-06-02
Payer: MEDICAID

## 2023-06-02 ENCOUNTER — LAB VISIT (OUTPATIENT)
Dept: LAB | Facility: CLINIC | Age: 23
End: 2023-06-02
Payer: MEDICAID

## 2023-06-02 VITALS
WEIGHT: 182 LBS | HEART RATE: 75 BPM | SYSTOLIC BLOOD PRESSURE: 105 MMHG | DIASTOLIC BLOOD PRESSURE: 66 MMHG | BODY MASS INDEX: 30.29 KG/M2

## 2023-06-02 DIAGNOSIS — O99.013 ANEMIA DURING PREGNANCY IN THIRD TRIMESTER: ICD-10-CM

## 2023-06-02 DIAGNOSIS — O26.843 UTERINE SIZE-DATE DISCREPANCY IN THIRD TRIMESTER: ICD-10-CM

## 2023-06-02 DIAGNOSIS — Z3A.39 39 WEEKS GESTATION OF PREGNANCY: Primary | ICD-10-CM

## 2023-06-02 DIAGNOSIS — Z3A.38 38 WEEKS GESTATION OF PREGNANCY: ICD-10-CM

## 2023-06-02 LAB
BASOPHILS # BLD AUTO: 0.02 K/UL (ref 0–0.2)
BASOPHILS NFR BLD: 0.2 % (ref 0–1.9)
DIFFERENTIAL METHOD: ABNORMAL
EOSINOPHIL # BLD AUTO: 0.1 K/UL (ref 0–0.5)
EOSINOPHIL NFR BLD: 1.2 % (ref 0–8)
ERYTHROCYTE [DISTWIDTH] IN BLOOD BY AUTOMATED COUNT: 14.3 % (ref 11.5–14.5)
HCT VFR BLD AUTO: 29.3 % (ref 37–48.5)
HGB BLD-MCNC: 10 G/DL (ref 12–16)
IMM GRANULOCYTES # BLD AUTO: 0.11 K/UL (ref 0–0.04)
IMM GRANULOCYTES NFR BLD AUTO: 1.2 % (ref 0–0.5)
LYMPHOCYTES # BLD AUTO: 1.5 K/UL (ref 1–4.8)
LYMPHOCYTES NFR BLD: 16 % (ref 18–48)
MCH RBC QN AUTO: 29.6 PG (ref 27–31)
MCHC RBC AUTO-ENTMCNC: 34.1 G/DL (ref 32–36)
MCV RBC AUTO: 87 FL (ref 82–98)
MONOCYTES # BLD AUTO: 0.6 K/UL (ref 0.3–1)
MONOCYTES NFR BLD: 6.5 % (ref 4–15)
NEUTROPHILS # BLD AUTO: 6.8 K/UL (ref 1.8–7.7)
NEUTROPHILS NFR BLD: 74.9 % (ref 38–73)
NRBC BLD-RTO: 0 /100 WBC
PLATELET # BLD AUTO: 159 K/UL (ref 150–450)
PMV BLD AUTO: 11.9 FL (ref 9.2–12.9)
RBC # BLD AUTO: 3.38 M/UL (ref 4–5.4)
WBC # BLD AUTO: 9.06 K/UL (ref 3.9–12.7)

## 2023-06-02 PROCEDURE — 59426 PR ANTEPARTUM CARE ONLY, >7 VISITS: ICD-10-PCS | Mod: TH,S$GLB,, | Performed by: STUDENT IN AN ORGANIZED HEALTH CARE EDUCATION/TRAINING PROGRAM

## 2023-06-02 PROCEDURE — 36415 PR COLLECTION VENOUS BLOOD,VENIPUNCTURE: ICD-10-PCS | Mod: ,,, | Performed by: STUDENT IN AN ORGANIZED HEALTH CARE EDUCATION/TRAINING PROGRAM

## 2023-06-02 PROCEDURE — 86592 SYPHILIS TEST NON-TREP QUAL: CPT

## 2023-06-02 PROCEDURE — 36415 COLL VENOUS BLD VENIPUNCTURE: CPT | Mod: ,,, | Performed by: STUDENT IN AN ORGANIZED HEALTH CARE EDUCATION/TRAINING PROGRAM

## 2023-06-02 PROCEDURE — 59426 ANTEPARTUM CARE ONLY: CPT | Mod: TH,S$GLB,, | Performed by: STUDENT IN AN ORGANIZED HEALTH CARE EDUCATION/TRAINING PROGRAM

## 2023-06-02 PROCEDURE — 85025 COMPLETE CBC W/AUTO DIFF WBC: CPT

## 2023-06-02 NOTE — PROGRESS NOTES
2023  Chief Complaint   Patient presents with    Routine Prenatal Visit     39w3d     22 y.o.  at 39w3d  Estimated Date of Delivery: 2023, by Ultrasound, dating reviewed.      Patient reports: Good fetal movements reported. No Bleeding, leakage of fluid or contractions . She is doing well.  She is taking prenatal vitamins. Ms. Cortes   is adjusting well and has a good support system of family and friends. She is coping with pregnancy and having no difficulty with sleep.  This is her first visit with me. She has been follow by [facility] during this pregnancy. Complicated by limited prenatal alexander and non compliance.    Prenatal labs reviewed and updated today    OB History    Para Term  AB Living   1 0 0 0 0 0   SAB IAB Ectopic Multiple Live Births   0 0 0 0 0      # Outcome Date GA Lbr Adrian/2nd Weight Sex Delivery Anes PTL Lv   1 Current                Review of Systems:  General ROS: negative for headache or visual changes  Breast ROS: negative for breast lumps  Gastrointestinal ROS: negative for constipation, diarrhea or nausea/vomiting  Musculoskeletal ROS: negative for pain in joints or swelling in face or hands.   Neurological ROS: negative for - headaches, numbness/tingling or visual changes      Physical Exam:  /66   Pulse 75   Wt 82.6 kg (182 lb)   LMP 2022   BMI 30.29 kg/m²     Constitutional: She is oriented to person, place, and time. She appears well-developed and well-nourished. No distress.     Pulmonary/Chest: Effort normal. No respiratory distress  Abdominal: Soft, gravid, nontender. No rebound and no guarding.   Genitourinary: 50/-3  Musculoskeletal: Normal range of motion, Minimal peripheral edema.   Neurological: She is alert and oriented to person, place, and time. Coordination normal.   Skin: Skin is warm and dry. She is not diaphoretic.  Psychiatric: She has a normal mood and affect.      Assessment:  Overall doing well.   22 y.o.at 39w3d by  16w1d sono    Patient Active Problem List   Diagnosis    33 weeks gestation of pregnancy    Uterine size-date discrepancy in first trimester    History of chlamydia    Anemia during pregnancy in third trimester     Current Outpatient Medications on File Prior to Visit   Medication Sig Dispense Refill    ferrous sulfate 325 (65 FE) MG EC tablet Take 1 tablet (325 mg total) by mouth once daily. for 180 doses 90 tablet 1    prenatal vit/iron fum/folic ac (PRENATAL 1+1 ORAL) Take by mouth.       No current facility-administered medications on file prior to visit.     There are no diagnoses linked to this encounter.   Plan:  Overall doing well  - S<D, will order growth  - Continue daily PNVs and iron  - Follow up 1 Weeks, bleeding/pain precautions, kick counts, labor and pre-eclampsia  precautions discussed.

## 2023-06-03 LAB — RPR SER QL: NORMAL

## 2023-06-04 ENCOUNTER — NURSE TRIAGE (OUTPATIENT)
Dept: ADMINISTRATIVE | Facility: CLINIC | Age: 23
End: 2023-06-04
Payer: MEDICAID

## 2023-06-04 NOTE — TELEPHONE ENCOUNTER
Caller states she is 39 weeks pregnant and is passing large clots. Caller denies ABd pain at this time.  Pt advised per protocol and verbalized understanding.     Reason for Disposition   SEVERE vaginal bleeding (e.g., soaking 2 pads per hour or large blood clots and present 2 or more hours)    Additional Information   Negative: Shock suspected (e.g., cold/pale/clammy skin, too weak to stand, low BP, rapid pulse)   Negative: Difficult to awaken or acting confused (e.g., disoriented, slurred speech)   Negative: Passed out (i.e., lost consciousness, collapsed and was not responding)   Negative: Sounds like a life-threatening emergency to the triager   Negative: SEVERE abdominal pain    Protocols used: Pregnancy - Vaginal Bleeding Less Than 20 Weeks [facility]

## 2023-06-06 ENCOUNTER — OUTSIDE PLACE OF SERVICE (OUTPATIENT)
Dept: OBSTETRICS AND GYNECOLOGY | Facility: CLINIC | Age: 23
End: 2023-06-06

## 2023-06-06 PROCEDURE — 59409 PR OBSTETRICAL CARE,VAG DELIV ONLY: ICD-10-PCS | Mod: TH,,, | Performed by: STUDENT IN AN ORGANIZED HEALTH CARE EDUCATION/TRAINING PROGRAM

## 2023-06-06 PROCEDURE — 59409 OBSTETRICAL CARE: CPT | Mod: TH,,, | Performed by: STUDENT IN AN ORGANIZED HEALTH CARE EDUCATION/TRAINING PROGRAM

## 2023-09-20 ENCOUNTER — PATIENT MESSAGE (OUTPATIENT)
Dept: OBSTETRICS AND GYNECOLOGY | Facility: CLINIC | Age: 23
End: 2023-09-20
Payer: MEDICAID

## 2023-09-26 ENCOUNTER — TELEPHONE (OUTPATIENT)
Dept: OBSTETRICS AND GYNECOLOGY | Facility: CLINIC | Age: 23
End: 2023-09-26

## 2024-04-01 PROBLEM — Z3A.33 33 WEEKS GESTATION OF PREGNANCY: Status: RESOLVED | Noted: 2023-04-21 | Resolved: 2024-04-01
